# Patient Record
Sex: FEMALE | Race: WHITE | NOT HISPANIC OR LATINO | URBAN - METROPOLITAN AREA
[De-identification: names, ages, dates, MRNs, and addresses within clinical notes are randomized per-mention and may not be internally consistent; named-entity substitution may affect disease eponyms.]

---

## 2021-05-23 ENCOUNTER — EMERGENCY (EMERGENCY)
Facility: HOSPITAL | Age: 46
LOS: 0 days | Discharge: HOME | End: 2021-05-23
Attending: EMERGENCY MEDICINE
Payer: COMMERCIAL

## 2021-05-23 VITALS
RESPIRATION RATE: 19 BRPM | OXYGEN SATURATION: 97 % | HEIGHT: 65 IN | HEART RATE: 86 BPM | TEMPERATURE: 98 F | SYSTOLIC BLOOD PRESSURE: 129 MMHG | WEIGHT: 218.92 LBS | DIASTOLIC BLOOD PRESSURE: 79 MMHG

## 2021-05-23 DIAGNOSIS — J30.2 OTHER SEASONAL ALLERGIC RHINITIS: ICD-10-CM

## 2021-05-23 DIAGNOSIS — R79.9 ABNORMAL FINDING OF BLOOD CHEMISTRY, UNSPECIFIED: ICD-10-CM

## 2021-05-23 DIAGNOSIS — R05 COUGH: ICD-10-CM

## 2021-05-23 LAB
ALBUMIN SERPL ELPH-MCNC: 3.2 G/DL — LOW (ref 3.3–5)
ALP SERPL-CCNC: 56 U/L — SIGNIFICANT CHANGE UP (ref 40–120)
ALT FLD-CCNC: 19 U/L — SIGNIFICANT CHANGE UP (ref 12–78)
ANION GAP SERPL CALC-SCNC: 8 MMOL/L — SIGNIFICANT CHANGE UP (ref 5–17)
AST SERPL-CCNC: 13 U/L — LOW (ref 15–37)
BASOPHILS # BLD AUTO: 0.03 K/UL — SIGNIFICANT CHANGE UP (ref 0–0.2)
BASOPHILS NFR BLD AUTO: 0.6 % — SIGNIFICANT CHANGE UP (ref 0–2)
BILIRUB SERPL-MCNC: 0.6 MG/DL — SIGNIFICANT CHANGE UP (ref 0.2–1.2)
BUN SERPL-MCNC: 17 MG/DL — SIGNIFICANT CHANGE UP (ref 7–23)
CALCIUM SERPL-MCNC: 8.2 MG/DL — LOW (ref 8.5–10.1)
CHLORIDE SERPL-SCNC: 106 MMOL/L — SIGNIFICANT CHANGE UP (ref 96–108)
CO2 SERPL-SCNC: 24 MMOL/L — SIGNIFICANT CHANGE UP (ref 22–31)
CREAT SERPL-MCNC: 0.58 MG/DL — SIGNIFICANT CHANGE UP (ref 0.5–1.3)
D DIMER BLD IA.RAPID-MCNC: 195 NG/ML DDU — SIGNIFICANT CHANGE UP
EOSINOPHIL # BLD AUTO: 0.76 K/UL — HIGH (ref 0–0.5)
EOSINOPHIL NFR BLD AUTO: 14.2 % — HIGH (ref 0–6)
GLUCOSE SERPL-MCNC: 91 MG/DL — SIGNIFICANT CHANGE UP (ref 70–99)
HCG SERPL-ACNC: <1 MIU/ML — SIGNIFICANT CHANGE UP
HCT VFR BLD CALC: 34.4 % — LOW (ref 34.5–45)
HGB BLD-MCNC: 11.4 G/DL — LOW (ref 11.5–15.5)
IMM GRANULOCYTES NFR BLD AUTO: 0 % — SIGNIFICANT CHANGE UP (ref 0–1.5)
LYMPHOCYTES # BLD AUTO: 2.1 K/UL — SIGNIFICANT CHANGE UP (ref 1–3.3)
LYMPHOCYTES # BLD AUTO: 39.3 % — SIGNIFICANT CHANGE UP (ref 13–44)
MCHC RBC-ENTMCNC: 30.3 PG — SIGNIFICANT CHANGE UP (ref 27–34)
MCHC RBC-ENTMCNC: 33.1 GM/DL — SIGNIFICANT CHANGE UP (ref 32–36)
MCV RBC AUTO: 91.5 FL — SIGNIFICANT CHANGE UP (ref 80–100)
MONOCYTES # BLD AUTO: 0.55 K/UL — SIGNIFICANT CHANGE UP (ref 0–0.9)
MONOCYTES NFR BLD AUTO: 10.3 % — SIGNIFICANT CHANGE UP (ref 2–14)
NEUTROPHILS # BLD AUTO: 1.91 K/UL — SIGNIFICANT CHANGE UP (ref 1.8–7.4)
NEUTROPHILS NFR BLD AUTO: 35.6 % — LOW (ref 43–77)
NRBC # BLD: 0 /100 WBCS — SIGNIFICANT CHANGE UP (ref 0–0)
PLATELET # BLD AUTO: 303 K/UL — SIGNIFICANT CHANGE UP (ref 150–400)
POTASSIUM SERPL-MCNC: 3.6 MMOL/L — SIGNIFICANT CHANGE UP (ref 3.5–5.3)
POTASSIUM SERPL-SCNC: 3.6 MMOL/L — SIGNIFICANT CHANGE UP (ref 3.5–5.3)
PROT SERPL-MCNC: 7.6 GM/DL — SIGNIFICANT CHANGE UP (ref 6–8.3)
RBC # BLD: 3.76 M/UL — LOW (ref 3.8–5.2)
RBC # FLD: 13.3 % — SIGNIFICANT CHANGE UP (ref 10.3–14.5)
SODIUM SERPL-SCNC: 138 MMOL/L — SIGNIFICANT CHANGE UP (ref 135–145)
WBC # BLD: 5.35 K/UL — SIGNIFICANT CHANGE UP (ref 3.8–10.5)
WBC # FLD AUTO: 5.35 K/UL — SIGNIFICANT CHANGE UP (ref 3.8–10.5)

## 2021-05-23 PROCEDURE — 99284 EMERGENCY DEPT VISIT MOD MDM: CPT

## 2021-05-23 NOTE — ED PROVIDER NOTE - OBJECTIVE STATEMENT
45 y/o F with a PMHx of seasonal allergies presents to the ED c/o abnormal test results today. Patient was seen at at Urgent care today for coughing symptoms x2 weeks and had a XR and d-dimer preformed. Patient states her cough is attributed to her allergies. Urgent Care physician called Patient and told her d-dimer is elevated and to come to the ER for a CT scan of her lungs. Patient had negative COVID at urgent care today. Denies COVID, Chest papain, HTN, HLD and DM.

## 2021-05-23 NOTE — ED PROVIDER NOTE - PATIENT PORTAL LINK FT
You can access the FollowMyHealth Patient Portal offered by [facility] by registering at the following website: [email or web address]. By joining [facility]’s FollowMyHealth portal, you will also be able to view your health information using other applications (apps) compatible with our system.

## 2021-05-23 NOTE — ED ADULT TRIAGE NOTE - CHIEF COMPLAINT QUOTE
dry non-productive Cough X2. Today went to urgent care and was told elevated DDimer  Covid last year

## 2021-05-23 NOTE — ED PROVIDER NOTE - PROGRESS NOTE DETAILS
Pt remains chest pain free, no sob, and breathing comfortably. Here, DDimer is negative. Had shared decision making, and given no risk factors for PE, and asymptomatic currently, risk of CT is greater than benefit, and pt declines CT angio and is ready for d/c home, already prescribed cough medication, and eager for d/c.

## 2021-05-23 NOTE — ED PROVIDER NOTE - CLINICAL SUMMARY MEDICAL DECISION MAKING FREE TEXT BOX
DDx: Allergic cough due to seasonal allergies. Patient had recent COVID test negative today. O2 sat 100% no risk for PE.   Plan: CBC, CMP, D-dimer and reassess.

## 2021-05-23 NOTE — ED ADULT NURSE NOTE - OBJECTIVE STATEMENT
Patient states she came from urgent care and was sent to ED for elevated D-dimer test (0.74). Patient complains of ongoing dry cough. Patient tested negative for covid today and denies pain, fever, chills, nausea, vomiting. Denies leg pain or shortness of breath.

## 2021-12-09 PROBLEM — J30.2 OTHER SEASONAL ALLERGIC RHINITIS: Chronic | Status: ACTIVE | Noted: 2021-05-23

## 2022-01-21 ENCOUNTER — APPOINTMENT (OUTPATIENT)
Dept: OBGYN | Facility: CLINIC | Age: 47
End: 2022-01-21

## 2022-02-01 ENCOUNTER — APPOINTMENT (OUTPATIENT)
Dept: OBGYN | Facility: CLINIC | Age: 47
End: 2022-02-01
Payer: COMMERCIAL

## 2022-02-01 ENCOUNTER — NON-APPOINTMENT (OUTPATIENT)
Age: 47
End: 2022-02-01

## 2022-02-01 VITALS
BODY MASS INDEX: 38.32 KG/M2 | DIASTOLIC BLOOD PRESSURE: 80 MMHG | WEIGHT: 230 LBS | HEIGHT: 65 IN | SYSTOLIC BLOOD PRESSURE: 130 MMHG

## 2022-02-01 DIAGNOSIS — Z00.00 ENCOUNTER FOR GENERAL ADULT MEDICAL EXAMINATION W/OUT ABNORMAL FINDINGS: ICD-10-CM

## 2022-02-01 PROCEDURE — 99203 OFFICE O/P NEW LOW 30 MIN: CPT

## 2022-02-01 NOTE — PHYSICAL EXAM
[Appropriately responsive] : appropriately responsive [Alert] : alert [No Acute Distress] : no acute distress [Soft] : soft [Non-tender] : non-tender [Non-distended] : non-distended [No HSM] : No HSM [No Lesions] : no lesions [No Mass] : no mass

## 2022-02-01 NOTE — PLAN
[FreeTextEntry1] : 47 year old P2 with abnormal pap LSIL\par -to return for colposcopy \par -UTD on all other routine care\par Priyanka Quick MD

## 2022-02-01 NOTE — HISTORY OF PRESENT ILLNESS
[FreeTextEntry1] : 47 year old P2 here for discussion for colposcopy. Patient goes to a family medicine clinic where LSIL pap found 2021. Has a hx of abnormal paps with normal colpo in the past. Desires to transfer GYN care here. UTD on colonosocpy/ mammogram. Records reviewed. \par Pobhx: C/S x 2\par PMH: none\par PSH: open midline vertical galbladder, BTL, C/S x 2

## 2022-02-02 LAB
BASOPHILS # BLD AUTO: 0.04 K/UL
BASOPHILS NFR BLD AUTO: 0.8 %
EOSINOPHIL # BLD AUTO: 0.41 K/UL
EOSINOPHIL NFR BLD AUTO: 7.7 %
HCT VFR BLD CALC: 38.6 %
HGB BLD-MCNC: 12.1 G/DL
IMM GRANULOCYTES NFR BLD AUTO: 0.2 %
LYMPHOCYTES # BLD AUTO: 2.11 K/UL
LYMPHOCYTES NFR BLD AUTO: 39.6 %
MAN DIFF?: NORMAL
MCHC RBC-ENTMCNC: 30.3 PG
MCHC RBC-ENTMCNC: 31.3 GM/DL
MCV RBC AUTO: 96.7 FL
MONOCYTES # BLD AUTO: 0.62 K/UL
MONOCYTES NFR BLD AUTO: 11.6 %
NEUTROPHILS # BLD AUTO: 2.14 K/UL
NEUTROPHILS NFR BLD AUTO: 40.1 %
PLATELET # BLD AUTO: 287 K/UL
RBC # BLD: 3.99 M/UL
RBC # FLD: 14.1 %
WBC # FLD AUTO: 5.33 K/UL

## 2022-03-01 ENCOUNTER — APPOINTMENT (OUTPATIENT)
Dept: OBGYN | Facility: CLINIC | Age: 47
End: 2022-03-01
Payer: COMMERCIAL

## 2022-03-01 VITALS
HEIGHT: 65 IN | DIASTOLIC BLOOD PRESSURE: 85 MMHG | BODY MASS INDEX: 38.32 KG/M2 | WEIGHT: 230 LBS | SYSTOLIC BLOOD PRESSURE: 140 MMHG

## 2022-03-01 DIAGNOSIS — R87.619 UNSPECIFIED ABNORMAL CYTOLOGICAL FINDINGS IN SPECIMENS FROM CERVIX UTERI: ICD-10-CM

## 2022-03-01 DIAGNOSIS — R10.2 PELVIC AND PERINEAL PAIN: ICD-10-CM

## 2022-03-01 PROCEDURE — 57456 ENDOCERV CURETTAGE W/SCOPE: CPT

## 2022-03-01 NOTE — ASSESSMENT
[FreeTextEntry1] : 47 year old here for colposcopy \par performed, adequate\par f/u results\par likely repeat pap 1 year\par Priyanka Quick MD

## 2022-03-01 NOTE — PROCEDURE
[Colposcopy] : Colposcopy  [Consent Obtained] : Consent obtained [LGSIL] : LGSIL [Time out performed] : Pre-procedure time out performed.  Patient's name, date of birth and procedure confirmed. [Risks] : risks [Benefits] : benefits [Alternatives] : alternatives [Patient] : patient [Infection] : infection [Bleeding] : bleeding [Allergic Reaction] : allergic reaction [Colposcopy Adequate] : colposcopy adequate [ECC Performed] : ECC performed [No Abnormalities] : no abnormalities [Biopsy] : biopsy taken [Hemostasis Obtained] : Hemostasis obtained [Tolerated Well] : the patient tolerated the procedure well [de-identified] : 1 [de-identified] : acetowhite at 12 oclock\par anteverted tacked up cervix  [de-identified] : 12 o clock

## 2022-03-02 LAB — BACTERIA UR CULT: NORMAL

## 2022-03-09 LAB — CORE LAB BIOPSY: NORMAL

## 2022-03-15 ENCOUNTER — APPOINTMENT (OUTPATIENT)
Dept: OBGYN | Facility: CLINIC | Age: 47
End: 2022-03-15

## 2022-03-17 ENCOUNTER — APPOINTMENT (OUTPATIENT)
Dept: ULTRASOUND IMAGING | Facility: CLINIC | Age: 47
End: 2022-03-17

## 2022-03-22 ENCOUNTER — OUTPATIENT (OUTPATIENT)
Dept: OUTPATIENT SERVICES | Facility: HOSPITAL | Age: 47
LOS: 1 days | End: 2022-03-22
Payer: COMMERCIAL

## 2022-03-22 ENCOUNTER — APPOINTMENT (OUTPATIENT)
Dept: ULTRASOUND IMAGING | Facility: CLINIC | Age: 47
End: 2022-03-22
Payer: COMMERCIAL

## 2022-03-22 DIAGNOSIS — R10.2 PELVIC AND PERINEAL PAIN: ICD-10-CM

## 2022-03-22 PROCEDURE — 76856 US EXAM PELVIC COMPLETE: CPT

## 2022-03-22 PROCEDURE — 76856 US EXAM PELVIC COMPLETE: CPT | Mod: 26

## 2022-03-22 PROCEDURE — 76830 TRANSVAGINAL US NON-OB: CPT

## 2022-03-22 PROCEDURE — 76830 TRANSVAGINAL US NON-OB: CPT | Mod: 26

## 2023-04-18 ENCOUNTER — LABORATORY RESULT (OUTPATIENT)
Age: 48
End: 2023-04-18

## 2023-04-18 ENCOUNTER — APPOINTMENT (OUTPATIENT)
Dept: OBGYN | Facility: CLINIC | Age: 48
End: 2023-04-18
Payer: COMMERCIAL

## 2023-04-18 VITALS
HEIGHT: 64.5 IN | DIASTOLIC BLOOD PRESSURE: 82 MMHG | WEIGHT: 237 LBS | SYSTOLIC BLOOD PRESSURE: 138 MMHG | BODY MASS INDEX: 39.97 KG/M2

## 2023-04-18 DIAGNOSIS — Z01.419 ENCOUNTER FOR GYNECOLOGICAL EXAMINATION (GENERAL) (ROUTINE) W/OUT ABNORMAL FINDINGS: ICD-10-CM

## 2023-04-18 PROCEDURE — 99396 PREV VISIT EST AGE 40-64: CPT

## 2023-04-18 PROCEDURE — 36415 COLL VENOUS BLD VENIPUNCTURE: CPT

## 2023-04-18 NOTE — HISTORY OF PRESENT ILLNESS
[Patient reported mammogram was normal] : Patient reported mammogram was normal [Patient reported colonoscopy was normal] : Patient reported colonoscopy was normal [FreeTextEntry1] : 47 yo female pt present for an annual wellness visit LMP: 04/05/23. The pt was last seen 02/22. The pt states that her periods have been normal (symptoms)  but irregular where she goes a few months without her period. She has noticed night sweats that started a couple of months ago, but she hasn't experienced this recently. \par \par She noticed a lump (leg) on Wednesday and believes it is the same size. She denies any itchiness. She went to urgent care where she had an US and it was deemed normal.  \par \par Pobhx: C/S x 2\par PMH: none\par PSH: open midline vertical galbladder, BTL, C/S x 2 \par  [Mammogramdate] : 2023 [TextBox_19] : dense breasts  [ColonoscopyDate] : 2021

## 2023-04-18 NOTE — PHYSICAL EXAM
[Appropriately responsive] : appropriately responsive [Alert] : alert [No Acute Distress] : no acute distress [No Lymphadenopathy] : no lymphadenopathy [Soft] : soft [Non-tender] : non-tender [Non-distended] : non-distended [No HSM] : No HSM [No Lesions] : no lesions [No Mass] : no mass [Oriented x3] : oriented x3 [Examination Of The Breasts] : a normal appearance [No Masses] : no breast masses were palpable [Labia Majora] : normal [Labia Minora] : normal [Normal] : normal [Uterine Adnexae] : normal [FreeTextEntry6] : limited

## 2023-04-18 NOTE — PLAN
[FreeTextEntry1] : 47 yo female pt present for an annual wellness exam\par \par HCM\par -pap done today \par -GC/CT done today\par -blood drawn today pt to see PCP, given referrals \par -per pt colonoscopy UTD\par -breast mammo/ sono utd\par -referral for primary care\par -rto 1 year \par Priyanka Quick MD

## 2023-12-18 ENCOUNTER — APPOINTMENT (OUTPATIENT)
Dept: ORTHOPEDIC SURGERY | Facility: CLINIC | Age: 48
End: 2023-12-18
Payer: OTHER MISCELLANEOUS

## 2023-12-18 VITALS — BODY MASS INDEX: 40.46 KG/M2 | HEIGHT: 64 IN | WEIGHT: 237 LBS

## 2023-12-18 DIAGNOSIS — Z78.9 OTHER SPECIFIED HEALTH STATUS: ICD-10-CM

## 2023-12-18 PROCEDURE — 73110 X-RAY EXAM OF WRIST: CPT | Mod: RT

## 2023-12-18 PROCEDURE — 99204 OFFICE O/P NEW MOD 45 MIN: CPT | Mod: 25

## 2023-12-18 PROCEDURE — L3908: CPT | Mod: RT

## 2023-12-18 RX ORDER — IBUPROFEN 800 MG/1
800 TABLET, FILM COATED ORAL 3 TIMES DAILY
Qty: 30 | Refills: 3 | Status: ACTIVE | COMMUNITY
Start: 2023-12-18 | End: 1900-01-01

## 2023-12-18 NOTE — ASSESSMENT
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 We discussed that most of the TFCC is avascular and tears are slow to heal if at all but that surgical results are not guaranteed due to the poor healing capacity of the structure.  Even at surgery, most tears cannot be repaired, but are merely debrided.  We discussed the use of nsaids, bracing, rest, local modalities, injection and surgery in the treatment of TFCC tears. At this point, the patient will proceed with non-operative treatment.  Provided right wrist brace 24/7 x 6 weeks. (pt would benefit from use of Wrist Widget). RTO in 6 weeks for fu care. Pt is currently working full duty. PRN Ibuprofen 800 mg tid rx provided for discomfort.  NSAIDs recommended.  Patient warned of risk of NSAID medication to stomach and GI tract, risk of increase blood pressure, cardiac risk, and risk of fluid retention.  The patient should clear taking medication with internist/PMD if any problem with heart, blood pressure, or GI system exists.

## 2023-12-18 NOTE — IMAGING
[de-identified] : right wrist with no skin changes or bony deformity. TTP over the TFCC with + TFCC Grind Test. Murray, and Finklestein Tests are negative. ROM is full with ulnar sided wrist pain on extension. strength is 5/5 in all planes. , intrinsic and pinch strength is 5/5 All digits are nvi with FAROM.  Xray: right wrist shows ulna minus and no acute bony pathology.  11/2021

## 2023-12-18 NOTE — WORK
[Sprain/Strain] : sprain/strain [Was the competent medical cause of the injury] : was the competent medical cause of the injury [Are consistent with the injury] : are consistent with the injury [Consistent with my objective findings] : consistent with my objective findings [Partial] : partial [Does not reveal pre-existing condition(s) that may affect treatment/prognosis] : does not reveal pre-existing condition(s) that may affect treatment/prognosis [Can return to work without limitations on ______] : can return to work without limitations on [unfilled] [N/A] : : Not Applicable [Patient] : patient [No Rx restrictions] : No Rx restrictions. [I provided the services listed above] :  I provided the services listed above. [FreeTextEntry1] : guarded

## 2023-12-18 NOTE — HISTORY OF PRESENT ILLNESS
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  12/18/2023 :ERICH GARCIA , a 48 year old female, presents today for right hand, wrist pain, injured while at work on 11/3/23. Pt works at [employer]. Pt was putting away a foot tray when a heavy door closed on her right hand wrist and drove it into a door jam. Pt is here for initial care due to the fact that her pain has not resolved. Pt states xray was performed at [facility] and there were reportedly no findings. Pt denies numbness/tingling. Pain is worse with pushing/pulling/grasping.    PMH: denied. Allergies: PCN (swelling)

## 2024-02-01 ENCOUNTER — APPOINTMENT (OUTPATIENT)
Dept: ORTHOPEDIC SURGERY | Facility: CLINIC | Age: 49
End: 2024-02-01
Payer: OTHER MISCELLANEOUS

## 2024-02-01 VITALS — HEIGHT: 64 IN | BODY MASS INDEX: 40.46 KG/M2 | WEIGHT: 237 LBS

## 2024-02-01 PROCEDURE — 99213 OFFICE O/P EST LOW 20 MIN: CPT

## 2024-02-01 NOTE — IMAGING
[de-identified] : right wrist with no skin changes or bony deformity. TTP over the TFCC with + TFCC Grind Test. Murray, and Finklestein Tests are negative. ROM is full with ulnar sided wrist pain on extension. strength is 5/5 in all planes. , intrinsic and pinch strength is 5/5 All digits are nvi with FAROM.  Xray: right wrist shows ulna minus and no acute bony pathology.

## 2024-02-01 NOTE — REASON FOR VISIT
[FreeTextEntry2] :    02/01/2024 :ERICH CULP , a 49 year old female, presents today for right hand wrist

## 2024-02-01 NOTE — HISTORY OF PRESENT ILLNESS
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  2/1/2024:   12/18/2023 :ERICH GARCIA , a 48 year old female, presents today for right hand, wrist pain, injured while at work on 11/3/23. Pt works at [employer]. Pt was putting away a foot tray when a heavy door closed on her right hand wrist and drove it into a door jam. Pt is here for initial care due to the fact that her pain has not resolved. Pt states xray was performed at [facility] and there were reportedly no findings. Pt denies numbness/tingling. Pain is worse with pushing/pulling/grasping.    PMH: denied. Allergies: PCN (swelling)

## 2024-03-05 ENCOUNTER — APPOINTMENT (OUTPATIENT)
Dept: ORTHOPEDIC SURGERY | Facility: CLINIC | Age: 49
End: 2024-03-05
Payer: OTHER MISCELLANEOUS

## 2024-03-05 PROCEDURE — 99214 OFFICE O/P EST MOD 30 MIN: CPT

## 2024-03-05 NOTE — IMAGING
[de-identified] : right wrist with no skin changes or bony deformity. TTP over the TFCC with + TFCC Grind Test. Murray, and Finklestein Tests are negative. ROM is full with ulnar sided wrist pain on extension. strength is 5/5 in all planes. , intrinsic and pinch strength is 5/5 All digits are nvi with FAROM.  previous 3 view xray: right wrist shows ulna minus and no acute bony pathology.

## 2024-03-05 NOTE — HISTORY OF PRESENT ILLNESS
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  3/5/2024: pt here for fu of right TFCC injury. Pt is currently working and she has noted improvement with physical therapy.   2/1/2024: pt continues with right hand/wrist pain.   12/18/2023 :ERICH GARCIA , a 48 year old female, presents today for right hand, wrist pain, injured while at work on 11/3/23. Pt works at [employer]. Pt was putting away a foot tray when a heavy door closed on her right hand wrist and drove it into a door jam. Pt is here for initial care due to the fact that her pain has not resolved. Pt states xray was performed at [email or web address] and there were reportedly no findings. Pt denies numbness/tingling. Pain is worse with pushing/pulling/grasping.    PMH: denied. Allergies: PCN (swelling)

## 2024-03-05 NOTE — WORK
[Sprain/Strain] : sprain/strain [Was the competent medical cause of the injury] : was the competent medical cause of the injury [Are consistent with the injury] : are consistent with the injury [Consistent with my objective findings] : consistent with my objective findings [Does not reveal pre-existing condition(s) that may affect treatment/prognosis] : does not reveal pre-existing condition(s) that may affect treatment/prognosis [Partial] : partial [Can return to work without limitations on ______] : can return to work without limitations on [unfilled] [Patient] : patient [N/A] : : Not Applicable [No Rx restrictions] : No Rx restrictions. [I provided the services listed above] :  I provided the services listed above. [FreeTextEntry1] : guarded

## 2024-03-05 NOTE — ASSESSMENT
[FreeTextEntry1] : The patient was advised of the diagnosis. The natural history of the pathology was explained in full to the patient in layman's terms. All questions were answered. The risks and benefits of surgical and non-surgical treatment alternatives were explained in full to the patient.  Pt will continue PT x 8 weeks. RTO in 6 weeks for f/u care. Pt continues to use wrist widget prn. Pt declines CSI.

## 2024-05-02 ENCOUNTER — APPOINTMENT (OUTPATIENT)
Dept: ORTHOPEDIC SURGERY | Facility: CLINIC | Age: 49
End: 2024-05-02
Payer: OTHER MISCELLANEOUS

## 2024-05-02 VITALS — HEIGHT: 64 IN | BODY MASS INDEX: 40.46 KG/M2 | WEIGHT: 237 LBS

## 2024-05-02 PROCEDURE — 99214 OFFICE O/P EST MOD 30 MIN: CPT

## 2024-05-02 NOTE — ASSESSMENT
[FreeTextEntry1] : The patient was advised of the diagnosis. The natural history of the pathology was explained in full to the patient in layman's terms. All questions were answered. The risks and benefits of surgical and non-surgical treatment alternatives were explained in full to the patient.  Pt will continue PT x 8 weeks. RTO in 6 weeks for f/u care. Pt continues to use wrist widget prn. Pt declines CSI.  Pt is currently working full duty.

## 2024-05-02 NOTE — HISTORY OF PRESENT ILLNESS
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  5/2/2024: pt state OT has helped her right TFCC pain. Bracing is difficult to continue due to her line of work. Pt is currently working full duty.  Pt has not received a CSI to this region.  3/5/2024: pt here for fu of right TFCC injury. Pt is currently working and she has noted improvement with physical therapy.   2/1/2024: pt continues with right hand/wrist pain.   12/18/2023 :ERICH GARCIA , a 48 year old female, presents today for right hand, wrist pain, injured while at work on 11/3/23. Pt works at [employer]. Pt was putting away a foot tray when a heavy door closed on her right hand wrist and drove it into a door jam. Pt is here for initial care due to the fact that her pain has not resolved. Pt states xray was performed at [facility] and there were reportedly no findings. Pt denies numbness/tingling. Pain is worse with pushing/pulling/grasping.    PMH: denied. Allergies: PCN (swelling)

## 2024-05-02 NOTE — IMAGING
[de-identified] : right wrist with no skin changes or bony deformity. TTP over the TFCC with + TFCC Grind Test. Murray, and Finklestein Tests are negative. ROM is full with ulnar sided wrist pain on extension. strength is 5/5 in all planes. , intrinsic and pinch strength is 5/5 All digits are nvi with FAROM.  previous 3 view xray: right wrist shows ulna minus and no acute bony pathology.

## 2024-06-11 ENCOUNTER — APPOINTMENT (OUTPATIENT)
Dept: ORTHOPEDIC SURGERY | Facility: CLINIC | Age: 49
End: 2024-06-11
Payer: OTHER MISCELLANEOUS

## 2024-06-11 DIAGNOSIS — S69.81XA OTHER SPECIFIED INJURIES OF RIGHT WRIST, HAND AND FINGER(S), INITIAL ENCOUNTER: ICD-10-CM

## 2024-06-11 PROCEDURE — 99214 OFFICE O/P EST MOD 30 MIN: CPT

## 2024-06-11 NOTE — HISTORY OF PRESENT ILLNESS
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  6/11/24:  Pt reports that she is still having wrist pain.  Pt stopped therapy 2 weeks ago because it was not authorized.  5/2/2024: pt state OT has helped her right TFCC pain. Bracing is difficult to continue due to her line of work. Pt is currently working full duty.  Pt has not received a CSI to this region.  3/5/2024: pt here for fu of right TFCC injury. Pt is currently working and she has noted improvement with physical therapy.   2/1/2024: pt continues with right hand/wrist pain.   12/18/2023 :ERICH GARCIA , a 48 year old female, presents today for right hand, wrist pain, injured while at work on 11/3/23. Pt works at [employer]. Pt was putting away a foot tray when a heavy door closed on her right hand wrist and drove it into a door jam. Pt is here for initial care due to the fact that her pain has not resolved. Pt states xray was performed at [facility] and there were reportedly no findings. Pt denies numbness/tingling. Pain is worse with pushing/pulling/grasping.    PMH: denied. Allergies: PCN (swelling)

## 2024-06-11 NOTE — IMAGING
[de-identified] : right wrist with no skin changes or bony deformity. TTP over the TFCC with + TFCC Grind Test. Murray, and Finklestein Tests are negative. ROM is full with ulnar sided wrist pain on extension. strength is 5/5 in all planes. , intrinsic and pinch strength is 5/5 All digits are nvi with FAROM.  previous 3 view xray: right wrist shows ulna minus and no acute bony pathology.

## 2024-08-20 ENCOUNTER — APPOINTMENT (OUTPATIENT)
Dept: ORTHOPEDIC SURGERY | Facility: CLINIC | Age: 49
End: 2024-08-20
Payer: OTHER MISCELLANEOUS

## 2024-08-20 DIAGNOSIS — S69.81XA OTHER SPECIFIED INJURIES OF RIGHT WRIST, HAND AND FINGER(S), INITIAL ENCOUNTER: ICD-10-CM

## 2024-08-20 PROCEDURE — 99214 OFFICE O/P EST MOD 30 MIN: CPT

## 2024-08-20 NOTE — IMAGING
[de-identified] : right wrist with no skin changes or bony deformity. TTP over the TFCC with + TFCC Grind Test. Murray, and Finklestein Tests are negative. ROM is full with ulnar sided wrist pain on extension. strength is 5/5 in all planes. except 4/65 supination , intrinsic and pinch strength is 5/5 All digits are nvi with FAROM.

## 2024-08-20 NOTE — ASSESSMENT
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 We discussed that most of the TFCC is avascular and tears are slow to heal if at all but that surgical results are not guaranteed due to the poor healing capacity of the structure.  Even at surgery, most tears cannot be repaired, but are merely debrided.  We discussed the use of nsaids, bracing, rest, local modalities, injection and surgery in the treatment of TFCC tears. At this point, the patient will proceed with non-operative treatment.   NSAIDs recommended.  Patient warned of risk of NSAID medication to stomach and GI tract, risk of increase blood pressure, cardiac risk, and risk of fluid retention.  The patient should clear taking medication with internist/PMD if any problem with heart, blood pressure, or GI system exists.  she would like to hold off on injection for now

## 2024-08-20 NOTE — HISTORY OF PRESENT ILLNESS
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  8/20/24: intermittent pain- day to day-0 weakness more than pain in the wrist. 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  hasnt been to therapy in a while. Pt does not wear wrist widgit often, does not feel that it made a difference  6/11/24:  Pt reports that she is still having wrist pain.  Pt stopped therapy 2 weeks ago because it was not authorized.  5/2/2024: pt state OT has helped her right TFCC pain. Bracing is difficult to continue due to her line of work. Pt is currently working full duty.  Pt has not received a CSI to this region.  3/5/2024: pt here for fu of right TFCC injury. Pt is currently working and she has noted improvement with physical therapy.   2/1/2024: pt continues with right hand/wrist pain.   12/18/2023 :ERICH MARIAparnaYAIMA , a 48 year old female, presents today for right hand, wrist pain, injured while at work on 11/3/23. Pt works at [employer]. Pt was putting away a foot tray when a heavy door closed on her right hand wrist and drove it into a door jam. Pt is here for initial care due to the fact that her pain has not resolved. Pt states xray was performed at [facility] and there were reportedly no findings. Pt denies numbness/tingling. Pain is worse with pushing/pulling/grasping.    PMH: denied. Allergies: PCN (swelling)

## 2024-09-24 ENCOUNTER — NON-APPOINTMENT (OUTPATIENT)
Age: 49
End: 2024-09-24

## 2024-09-27 ENCOUNTER — LABORATORY RESULT (OUTPATIENT)
Age: 49
End: 2024-09-27

## 2024-09-27 ENCOUNTER — APPOINTMENT (OUTPATIENT)
Dept: OBGYN | Facility: CLINIC | Age: 49
End: 2024-09-27
Payer: COMMERCIAL

## 2024-09-27 VITALS
HEIGHT: 64 IN | SYSTOLIC BLOOD PRESSURE: 129 MMHG | WEIGHT: 239 LBS | BODY MASS INDEX: 40.8 KG/M2 | DIASTOLIC BLOOD PRESSURE: 87 MMHG

## 2024-09-27 DIAGNOSIS — Z11.51 ENCOUNTER FOR SCREENING FOR HUMAN PAPILLOMAVIRUS (HPV): ICD-10-CM

## 2024-09-27 DIAGNOSIS — Z13.0 ENCOUNTER FOR SCREENING FOR OTHER SUSPECTED ENDOCRINE DISORDER: ICD-10-CM

## 2024-09-27 DIAGNOSIS — Z01.419 ENCOUNTER FOR GYNECOLOGICAL EXAMINATION (GENERAL) (ROUTINE) W/OUT ABNORMAL FINDINGS: ICD-10-CM

## 2024-09-27 DIAGNOSIS — Z13.0 ENCOUNTER FOR SCREENING FOR DISEASES OF THE BLOOD AND BLOOD-FORMING ORGANS AND CERTAIN DISORDERS INVOLVING THE IMMUNE MECHANISM: ICD-10-CM

## 2024-09-27 DIAGNOSIS — Z13.29 ENCOUNTER FOR SCREENING FOR OTHER SUSPECTED ENDOCRINE DISORDER: ICD-10-CM

## 2024-09-27 DIAGNOSIS — Z13.228 ENCOUNTER FOR SCREENING FOR OTHER SUSPECTED ENDOCRINE DISORDER: ICD-10-CM

## 2024-09-27 PROCEDURE — 99396 PREV VISIT EST AGE 40-64: CPT

## 2024-09-27 PROCEDURE — 36415 COLL VENOUS BLD VENIPUNCTURE: CPT

## 2024-09-27 NOTE — END OF VISIT
[FreeTextEntry3] : I, Sydney Kamron, acted as a scribe on behalf of Dr. Nichole Edwards M.D. on 09/27/2024.  All medical entries made by the scribe were at my, Dr. Nichole Edwards M.D., direction and personally dictated by me on 09/27/2024. I have reviewed the chart and agree that the record accurately reflects my personal performance of the history, physical exam, assessment and plan. I have also personally directed, reviewed, and agreed with the chart.

## 2024-09-27 NOTE — HISTORY OF PRESENT ILLNESS
[FreeTextEntry1] : 49 year old  female presents for an annual. Last seen 2023, neg pap/+HPV (-16/18) LMP Aug, prior 6m earlier. C/o occasional hot flashes - not bothersome. Notes she has not seen PCP in a while and request screening bloodwork.  OBHx: c/s x2 GYNHx: LGSIL - s/p colpo 2022 (benign)  PSHx: open midline vertical galbladder, BTL, C/S x 2  She works as a PCA on Pulmonary unit [Mammogramdate] : 11/23

## 2024-09-27 NOTE — PLAN
[FreeTextEntry1] : 49 year old female presents for an annual  -PAP/HPV done -Advised mammo in Nov -Bloodwork as above -Pt reports colonoscopy utd -Advised f/u with PCP  RTO in 1 year

## 2024-09-29 LAB
ALBUMIN SERPL ELPH-MCNC: 4.4 G/DL
ALP BLD-CCNC: 93 U/L
ALT SERPL-CCNC: 24 U/L
ANION GAP SERPL CALC-SCNC: 12 MMOL/L
AST SERPL-CCNC: 25 U/L
BACTERIA UR CULT: NORMAL
BASOPHILS # BLD AUTO: 0.03 K/UL
BASOPHILS NFR BLD AUTO: 0.5 %
BILIRUB SERPL-MCNC: 0.4 MG/DL
BUN SERPL-MCNC: 16 MG/DL
CALCIUM SERPL-MCNC: 9.6 MG/DL
CHLORIDE SERPL-SCNC: 103 MMOL/L
CO2 SERPL-SCNC: 27 MMOL/L
CREAT SERPL-MCNC: 0.6 MG/DL
EGFR: 109 ML/MIN/1.73M2
EOSINOPHIL # BLD AUTO: 0.34 K/UL
EOSINOPHIL NFR BLD AUTO: 6 %
ESTIMATED AVERAGE GLUCOSE: 111 MG/DL
GLUCOSE SERPL-MCNC: 70 MG/DL
HBA1C MFR BLD HPLC: 5.5 %
HCT VFR BLD CALC: 37.8 %
HGB BLD-MCNC: 12.5 G/DL
IMM GRANULOCYTES NFR BLD AUTO: 0.2 %
LYMPHOCYTES # BLD AUTO: 2.04 K/UL
LYMPHOCYTES NFR BLD AUTO: 36 %
MAN DIFF?: NORMAL
MCHC RBC-ENTMCNC: 30.9 PG
MCHC RBC-ENTMCNC: 33.1 GM/DL
MCV RBC AUTO: 93.6 FL
MONOCYTES # BLD AUTO: 0.66 K/UL
MONOCYTES NFR BLD AUTO: 11.7 %
NEUTROPHILS # BLD AUTO: 2.58 K/UL
NEUTROPHILS NFR BLD AUTO: 45.6 %
PLATELET # BLD AUTO: 309 K/UL
POTASSIUM SERPL-SCNC: 4.1 MMOL/L
PROT SERPL-MCNC: 7.7 G/DL
RBC # BLD: 4.04 M/UL
RBC # FLD: 13.8 %
SODIUM SERPL-SCNC: 142 MMOL/L
TSH SERPL-ACNC: 1.57 UIU/ML
WBC # FLD AUTO: 5.66 K/UL

## 2024-09-30 LAB — HPV HIGH+LOW RISK DNA PNL CVX: DETECTED

## 2024-10-29 ENCOUNTER — APPOINTMENT (OUTPATIENT)
Dept: ORTHOPEDIC SURGERY | Facility: CLINIC | Age: 49
End: 2024-10-29
Payer: OTHER MISCELLANEOUS

## 2024-10-29 DIAGNOSIS — S69.81XA OTHER SPECIFIED INJURIES OF RIGHT WRIST, HAND AND FINGER(S), INITIAL ENCOUNTER: ICD-10-CM

## 2024-10-29 PROCEDURE — 99214 OFFICE O/P EST MOD 30 MIN: CPT
